# Patient Record
Sex: FEMALE
[De-identification: names, ages, dates, MRNs, and addresses within clinical notes are randomized per-mention and may not be internally consistent; named-entity substitution may affect disease eponyms.]

---

## 2022-06-07 ENCOUNTER — DASHBOARD ENCOUNTERS (OUTPATIENT)
Age: 57
End: 2022-06-07

## 2022-06-07 ENCOUNTER — LAB OUTSIDE AN ENCOUNTER (OUTPATIENT)
Dept: URBAN - METROPOLITAN AREA CLINIC 109 | Facility: CLINIC | Age: 57
End: 2022-06-07

## 2022-06-07 ENCOUNTER — OFFICE VISIT (OUTPATIENT)
Dept: URBAN - METROPOLITAN AREA CLINIC 109 | Facility: CLINIC | Age: 57
End: 2022-06-07
Payer: COMMERCIAL

## 2022-06-07 DIAGNOSIS — Z86.010 HISTORY OF COLON POLYPS: ICD-10-CM

## 2022-06-07 DIAGNOSIS — Z86.73 HISTORY OF STROKE: ICD-10-CM

## 2022-06-07 DIAGNOSIS — I10 PRIMARY HYPERTENSION: ICD-10-CM

## 2022-06-07 DIAGNOSIS — E11.9 TYPE 2 DIABETES MELLITUS WITHOUT COMPLICATION, WITHOUT LONG-TERM CURRENT USE OF INSULIN: ICD-10-CM

## 2022-06-07 PROBLEM — 59621000: Status: ACTIVE | Noted: 2022-06-07

## 2022-06-07 PROBLEM — 313436004: Status: ACTIVE | Noted: 2022-06-07

## 2022-06-07 PROBLEM — 428283002: Status: ACTIVE | Noted: 2022-06-07

## 2022-06-07 PROCEDURE — 99204 OFFICE O/P NEW MOD 45 MIN: CPT | Performed by: INTERNAL MEDICINE

## 2022-06-07 RX ORDER — BUPROPION HYDROCHLORIDE 150 MG/1
TAKE 1 TABLET (150 MG) BY ORAL ROUTE ONCE DAILY TABLET, EXTENDED RELEASE ORAL 1
Qty: 0 | Refills: 0 | Status: ON HOLD | COMMUNITY
Start: 1900-01-01

## 2022-06-07 RX ORDER — SERTRALINE 50 MG/1
1 TABLET TABLET, FILM COATED ORAL ONCE A DAY
Status: ACTIVE | COMMUNITY

## 2022-06-07 RX ORDER — APIXABAN 5 MG/1
AS DIRECTED TABLET, FILM COATED ORAL
Status: ACTIVE | COMMUNITY

## 2022-06-07 RX ORDER — ATORVASTATIN CALCIUM 40 MG/1
TABLET, FILM COATED ORAL
Qty: 0 | Refills: 0 | Status: ACTIVE | COMMUNITY
Start: 1900-01-01

## 2022-06-07 RX ORDER — LISINOPRIL 10 MG/1
TABLET ORAL
Qty: 0 | Refills: 0 | Status: ON HOLD | COMMUNITY
Start: 1900-01-01

## 2022-06-07 RX ORDER — LINACLOTIDE 145 UG/1
TAKE 1 CAPSULE BY ORAL ROUTE DAILY FOR 90 DAYS CAPSULE, GELATIN COATED ORAL 1
Qty: 90 | Refills: 1 | Status: ON HOLD | COMMUNITY
Start: 2017-08-15

## 2022-06-07 RX ORDER — AMLODIPINE BESYLATE 5 MG/1
1 TABLET TABLET ORAL ONCE A DAY
Status: ACTIVE | COMMUNITY

## 2022-06-07 NOTE — HPI-TODAY'S VISIT:
The patient is referred for surveillance colonoscopy for a history of colon polyps. Her last colonoscopy in 2017 was normal. No FH of colon cancer.  Denies change in bowel habits, blood in the stool,  abdominal pain or weight loss.  PMH hx CVA in 2016, has a caregiver and impaired memory.  On Eliquis. HTN, HLD, IDDM.

## 2022-09-14 ENCOUNTER — HOSPITAL ENCOUNTER (EMERGENCY)
Dept: HOSPITAL 5 - ED | Age: 57
Discharge: HOME | End: 2022-09-14
Payer: MEDICAID

## 2022-09-14 VITALS — DIASTOLIC BLOOD PRESSURE: 70 MMHG | SYSTOLIC BLOOD PRESSURE: 142 MMHG
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
## 2022-09-14 DIAGNOSIS — M25.471: Primary | ICD-10-CM
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
## 2022-09-14 PROCEDURE — 99283 EMERGENCY DEPT VISIT LOW MDM: CPT

## 2022-09-14 NOTE — EMERGENCY DEPARTMENT REPORT
ED Lower Extremity HPI





- General


Chief Complaint: Extremity Injury, Lower


Stated Complaint: ANKLE PAIN


Time Seen by Provider: 09/14/22 14:33


Source: patient


Mode of arrival: Ambulatory


Limitations: No Limitations





- History of Present Illness


Initial Comments: 





57-year-old black female with a past medical history of hypertension, 

hyperlipidemia, diabetes, and CVA presents to the emergency department for 

evaluation of right ankle pain.  She states that she fell while attempting to 

climb up her stairs at home 3 days ago and has had pain and swelling to her 

right ankle and foot since then.  She states that pain is 10 out of 10 and worse

with ambulation.


MD Complaint: ankle injury


-: Sudden, days(s) (3)


Injury: Ankle: Right


Place: home


Severity: severe


Severity scale (0 -10): 10


Worsens With: weight bearing


Context: fall


Associated Symptoms: swelling, able to partially bear weight.  denies: snap/pop 

sensation, numbness, tingling


Treatments Prior to Arrival: cold therapy





- Related Data


                                  Previous Rx's











 Medication  Instructions  Recorded  Last Taken  Type


 


Naproxen [Naprosyn] 500 mg PO BID #14 tab 09/14/22 Unknown Rx











                                    Allergies











Allergy/AdvReac Type Severity Reaction Status Date / Time


 


No Known Allergies Allergy   Verified 09/14/22 11:29














ED Review of Systems


ROS: 


Stated complaint: ANKLE PAIN


Other details as noted in HPI





Comment: All other systems reviewed and negative


Constitutional: denies: chills, fever


Respiratory: denies: shortness of breath


Cardiovascular: denies: chest pain, palpitations


Gastrointestinal: denies: abdominal pain, nausea, vomiting


Genitourinary: denies: urgency, dysuria


Musculoskeletal: denies: back pain


Neurological: denies: headache, weakness





ED Past Medical Hx





- Medications


Home Medications: 


                                Home Medications











 Medication  Instructions  Recorded  Confirmed  Last Taken  Type


 


Naproxen [Naprosyn] 500 mg PO BID #14 tab 09/14/22  Unknown Rx














ED Physical Exam





- General


Limitations: No Limitations


General appearance: alert, in no apparent distress





- Head


Head exam: Present: atraumatic, normocephalic





- Eye


Eye exam: Present: normal appearance.  Absent: conjunctival injection





- Neck


Neck exam: Present: normal inspection





- Respiratory


Respiratory exam: Absent: respiratory distress





- Cardiovascular


Cardiovascular Exam: Present: regular rate





- GI/Abdominal


GI/Abdominal exam: Absent: distended, tenderness





- Expanded Lower Extremity Exam


  ** Right


Lower Leg exam: Present: normal inspection


Ankle exam: Present: tenderness, swelling, ecchymosis.  Absent: full ROM, abr

asion, laceration, deformity, crepidus, dislocation, erythema


Foot/Toe exam: Present: tenderness, swelling, ecchymosis


Neuro vascular tendon exam: Present: no vascular compromise.  Absent: pulse 

deficit, abnormal cap refill, extremity cold to touch, pallor


Gait: Positive: observed and limited by pain





- Back Exam


Back exam: Present: normal inspection





- Neurological Exam


Neurological exam: Present: alert, oriented X3





- Psychiatric


Psychiatric exam: Present: normal affect, normal mood





- Skin


Skin exam: Present: warm, dry, intact, normal color





ED Course


                                   Vital Signs











  09/14/22





  11:27


 


Temperature 97.6 F


 


Pulse Rate 79


 


Respiratory 18





Rate 


 


Blood Pressure 146/65





[Left] 


 


O2 Sat by Pulse 97





Oximetry 














- Orthopedic Splinting/Casting


  ** Injury #1


Side: right


Lower Extremity Injury Location: ankle


Lower Extremity Immobilizer: stirrup splint (Preformed Velcro ankle stirrup with

 Ace wrap), Ace wrap


Other Orthopedic Equipment: crutches





ED Lower Extremity MDM





- Radiology Data


Radiology results: report reviewed, image reviewed





Right ankle x-ray:


FINDINGS:  


 BONES/JOINT(S): No acute fracture or subluxation. Normal bone mineralization.  


 


 SOFT TISSUES: No significant abnormality.  


 


 ADDITIONAL FINDINGS: None.  


 


 IMPRESSION:  


 1. No acute findings. 





- Medical Decision Making








57-year-old black female with a past medical history of hypertension, 

hyperlipidemia, diabetes, and CVA presents to the emergency department for 

evaluation of right ankle pain.  She states that she fell while attempting to 

climb up her stairs at home 3 days ago and has had pain and swelling to her 

right ankle and foot since then.  She states that pain is 10 out of 10 and worse

 with ambulation.





Right ankle x-ray without any acute abnormalities noted.  Patient placed in 

Velcro ankle stirrup and given crutches per my procedure note.  She is advised 

to take medications as prescribed and follow-up with orthopedics if no 

improvement or worsening symptoms.  Is advised to return to the emergency 

department as needed.  She verbalizes understanding of and agreement with plan o

f care.


Critical care attestation.: 


If time is entered above; I have spent that time in minutes in the direct care 

of this critically ill patient, excluding procedure time.








ED Disposition


Clinical Impression: 


 Pain and swelling of right ankle





Disposition: 01 HOME / SELF CARE / HOMELESS


Is pt being admited?: No


Does the pt Need Aspirin: No


Condition: Stable


Instructions:  [facility] Therapy for Routine Care of Injuries, Easy-to-Read, How to 

Use Cold Therapy, Easy-to-Read, Musculoskeletal Pain, Joint Pain, Easy-to-Read


Additional Instructions: 


Take medications as prescribed.  Follow-up with orthopedics if no improvement or

 worsening symptoms.  Return to the emergency department as needed.


Prescriptions: 


Naproxen [Naprosyn] 500 mg PO BID #14 tab


Referrals: 


THANIA MUELLER MD [Staff Physician] - 3-5 Days


Forms:  Work/School Release Form(ED)


Time of Disposition: 16:31

## 2022-09-14 NOTE — XRAY REPORT
XR ankle 3+V RT



INDICATION / CLINICAL INFORMATION:

fall, pain and swelling.



COMPARISON:

None available.

 

FINDINGS:

BONES/JOINT(S): No acute fracture or subluxation. Normal bone mineralization.



SOFT TISSUES: No significant abnormality.



ADDITIONAL FINDINGS: None.



IMPRESSION:

1. No acute findings.



Signer Name: Alec Negrete MD 

Signed: 9/14/2022 2:50 PM

Workstation Name: [id number]